# Patient Record
Sex: FEMALE | Race: WHITE | Employment: OTHER | URBAN - METROPOLITAN AREA
[De-identification: names, ages, dates, MRNs, and addresses within clinical notes are randomized per-mention and may not be internally consistent; named-entity substitution may affect disease eponyms.]

---

## 2023-10-27 PROBLEM — F32.A DEPRESSION: Status: ACTIVE | Noted: 2023-10-27

## 2023-10-27 PROBLEM — I34.1 MITRAL VALVE PROLAPSE: Status: ACTIVE | Noted: 2023-10-27

## 2023-10-27 PROBLEM — L40.3 PUSTULAR PSORIASIS OF PALMS AND SOLES: Status: ACTIVE | Noted: 2023-10-27

## 2023-10-27 PROBLEM — K58.0 IRRITABLE BOWEL SYNDROME WITH DIARRHEA: Status: ACTIVE | Noted: 2023-10-27

## 2023-10-27 PROBLEM — L40.0 PSORIASIS VULGARIS: Status: ACTIVE | Noted: 2023-08-10

## 2023-10-27 RX ORDER — SERTRALINE HYDROCHLORIDE 100 MG/1
2 TABLET, FILM COATED ORAL DAILY
COMMUNITY

## 2023-10-27 RX ORDER — IXEKIZUMAB 80 MG/ML
INJECTION, SOLUTION SUBCUTANEOUS
COMMUNITY
Start: 2022-02-14

## 2023-10-27 RX ORDER — LEFLUNOMIDE 20 MG/1
TABLET ORAL
COMMUNITY
Start: 2017-08-29 | End: 2023-10-31 | Stop reason: ALTCHOICE

## 2023-10-27 RX ORDER — FOLIC ACID 1 MG/1
1 TABLET ORAL
COMMUNITY

## 2023-10-27 RX ORDER — CLOBETASOL PROPIONATE 0.5 MG/G
OINTMENT TOPICAL
COMMUNITY
Start: 2016-03-11

## 2023-10-27 RX ORDER — NABUMETONE 750 MG/1
TABLET, FILM COATED ORAL
COMMUNITY
Start: 2017-08-29

## 2023-10-27 RX ORDER — AMITRIPTYLINE HYDROCHLORIDE 25 MG/1
1 TABLET, FILM COATED ORAL NIGHTLY
COMMUNITY

## 2023-10-27 RX ORDER — ARIPIPRAZOLE 10 MG/1
1 TABLET ORAL DAILY
COMMUNITY

## 2023-10-27 RX ORDER — SIMVASTATIN 40 MG/1
1 TABLET, FILM COATED ORAL DAILY
COMMUNITY

## 2023-10-27 RX ORDER — METHOTREXATE 25 MG/ML
0.5 INJECTION, SOLUTION INTRA-ARTERIAL; INTRAMUSCULAR; INTRAVENOUS
COMMUNITY
Start: 2015-01-07

## 2023-10-27 RX ORDER — CLOBETASOL PROPIONATE 0.5 MG/G
CREAM TOPICAL
COMMUNITY
Start: 2015-11-24

## 2023-10-27 RX ORDER — BUPROPION HYDROCHLORIDE 200 MG/1
1 TABLET, EXTENDED RELEASE ORAL DAILY
COMMUNITY

## 2023-10-31 ENCOUNTER — OFFICE VISIT (OUTPATIENT)
Dept: DERMATOLOGY | Facility: CLINIC | Age: 58
End: 2023-10-31
Payer: MEDICARE

## 2023-10-31 DIAGNOSIS — L40.9 PSORIASIS: Primary | ICD-10-CM

## 2023-10-31 PROCEDURE — 99214 OFFICE O/P EST MOD 30 MIN: CPT | Performed by: NURSE PRACTITIONER

## 2023-10-31 NOTE — PROGRESS NOTES
Subjective     Jazmyne Neal is a 58 y.o. female who presents for the following: Skin Check (Presents to office today for psoriasis follow-up. Overall doing well, Taltz injection was 2 weeks ago. Denies flares. No further complaints.).     Review of Systems:  No other skin or systemic complaints other than what is documented elsewhere in the note.    The following portions of the chart were reviewed this encounter and updated as appropriate:       Skin Cancer History  No skin cancer on file.    Specialty Problems          Dermatology Problems    Psoriasis vulgaris    Pustular psoriasis of palms and soles     Past Medical History:  Jazmyne Neal  has no past medical history on file.    Past Surgical History:  Jazmyne Neal  has a past surgical history that includes Other surgical history (07/12/2016).    Family History:  Patient family history includes Heart attack in her father.    Social History:  Jazmyne Neal  has no history on file for tobacco use, alcohol use, and drug use.    Allergies:  Metformin    Current Medications / CAM's:    Current Outpatient Medications:     amitriptyline (Elavil) 25 mg tablet, Take 1 tablet (25 mg) by mouth once daily at bedtime., Disp: , Rfl:     ARIPiprazole (Abilify) 10 mg tablet, Take 1 tablet (10 mg) by mouth once daily., Disp: , Rfl:     buPROPion SR (Wellbutrin SR) 200 mg 12 hr tablet, Take 1 tablet (200 mg) by mouth once daily., Disp: , Rfl:     clobetasol (Temovate) 0.05 % cream, 1 Application, Disp: , Rfl:     clobetasol (Temovate) 0.05 % ointment, 1 Application, Disp: , Rfl:     folic acid (Folvite) 1 mg tablet, Take 1 tablet (1 mg) by mouth once daily. except not on the day of methotrexate, Disp: , Rfl:     INFLIXIMAB IV, Infuse into a venous catheter. every 6 weeks, Disp: , Rfl:     ixekizumab (Taltz Autoinjector) 80 mg/mL injection, 0, Disp: , Rfl:     methotrexate 25 mg/mL injection, 0.5 mL (12.5 mg)., Disp: , Rfl:     nabumetone (Relafen) 750 mg tablet, 1 Tablet, Disp: , Rfl:      sertraline (Zoloft) 100 mg tablet, Take 2 tablets (200 mg) by mouth once daily., Disp: , Rfl:     simvastatin (Zocor) 40 mg tablet, Take 1 tablet (40 mg) by mouth once daily., Disp: , Rfl:     ixekizumab (Taltz) 80 mg/mL injection, Inject 1 mL (80 mg) under the skin every 28 (twenty-eight) days., Disp: 1 mL, Rfl: 11     Objective   Well appearing patient in no apparent distress; mood and affect are within normal limits.    A focused skin examination was performed. All findings within normal limits unless otherwise noted below.    Assessment/Plan   1. Psoriasis  Well-demarcated erythematous papules and plaques with overlying silvery scale.    PSORIASIS        Psoriasis is a lifelong skin disorder that can cause areas of thickened, scaly and red skin that can be very itchy. Psoriasis can affect your joints and has been linked to other conditions like diabetes, heart disease, depression, high cholesterol and others.        Psoriasis is usually diagnosed by a skin exam but your provider may do a biopsy (sample of tissue). It is not an infection or spread from person to person. You cannot predict when psoriasis may improve or flare. It is important to share with your provider how this condition affects your body and quality of life.     TREATMENT  Psoriasis is not curable, but treatments may reduce the symptoms and appearance of the disease.  Your provider may recommend one or more therapies. It is very important that you carefully follow the providers instructions for use.     CAUSES  - There is no known cause of psoriasis.  - It may be caused by a combination of immune, genetic, and environmental factors.   - Medications, physical or emotional stress and infections may cause flares.   - Smoking can worsen psoriasis, especially the palms and soles.     RESOURCES  The National Psoriasis Foundation (www.psoriasis.org) and the American Academy of Dermatology (https://www.aad.org/public/diseases/scaly-skin/psoriasis) are  available to provide psoriasis information and support.    Plan   - Continue Taltz as directed. PASI 100  - Reviewed outside provider notes for health specialty or primary care changes and updated chart and plan based on findings, if any.   - Labs ordered; Tspot  - Counseled patient regarding the chronic nature of this condition. Discussed current and/or newly prescribed medications as well as reasons to call office prior to next visit (new plaques or flares, new joint pain, etc).   - Addressed concerns and questions regarding the treatment and plan with patient in office today.   - Risks, benefits, side effects, alternatives and options were discussed with patient and the patient voiced understanding.        T-SPOT (Tb)    ixekizumab (Taltz) 80 mg/mL injection  Inject 1 mL (80 mg) under the skin every 28 (twenty-eight) days.

## 2023-11-07 ENCOUNTER — LAB (OUTPATIENT)
Dept: LAB | Facility: LAB | Age: 58
End: 2023-11-07
Payer: MEDICARE

## 2023-11-07 DIAGNOSIS — H15.102 UNSPECIFIED EPISCLERITIS, LEFT EYE: ICD-10-CM

## 2023-11-07 DIAGNOSIS — L40.9 PSORIASIS: ICD-10-CM

## 2023-11-07 DIAGNOSIS — H15.102 UNSPECIFIED EPISCLERITIS, LEFT EYE: Primary | ICD-10-CM

## 2023-11-07 PROCEDURE — 86780 TREPONEMA PALLIDUM: CPT

## 2023-11-07 PROCEDURE — 86038 ANTINUCLEAR ANTIBODIES: CPT

## 2023-11-07 PROCEDURE — 86225 DNA ANTIBODY NATIVE: CPT

## 2023-11-07 PROCEDURE — 86481 TB AG RESPONSE T-CELL SUSP: CPT

## 2023-11-07 PROCEDURE — 85025 COMPLETE CBC W/AUTO DIFF WBC: CPT

## 2023-11-07 PROCEDURE — 86235 NUCLEAR ANTIGEN ANTIBODY: CPT

## 2023-11-07 PROCEDURE — 86140 C-REACTIVE PROTEIN: CPT

## 2023-11-07 PROCEDURE — 36415 COLL VENOUS BLD VENIPUNCTURE: CPT

## 2023-11-07 PROCEDURE — 85652 RBC SED RATE AUTOMATED: CPT

## 2023-11-08 LAB
ANA PATTERN: ABNORMAL
ANA SER QL HEP2 SUBST: POSITIVE
ANA TITR SER IF: ABNORMAL {TITER}
BASOPHILS # BLD AUTO: 0.04 X10*3/UL (ref 0–0.1)
BASOPHILS NFR BLD AUTO: 0.7 %
CENTROMERE B AB SER-ACNC: <0.2 AI
CHROMATIN AB SERPL-ACNC: <0.2 AI
CRP SERPL-MCNC: 0.29 MG/DL
DSDNA AB SER-ACNC: <1 IU/ML
ENA JO1 AB SER QL IA: <0.2 AI
ENA RNP AB SER IA-ACNC: 0.2 AI
ENA SCL70 AB SER QL IA: <0.2 AI
ENA SM AB SER IA-ACNC: <0.2 AI
ENA SM+RNP AB SER QL IA: <0.2 AI
ENA SS-A AB SER IA-ACNC: 0.2 AI
ENA SS-B AB SER IA-ACNC: <0.2 AI
EOSINOPHIL # BLD AUTO: 0.14 X10*3/UL (ref 0–0.7)
EOSINOPHIL NFR BLD AUTO: 2.5 %
ERYTHROCYTE [DISTWIDTH] IN BLOOD BY AUTOMATED COUNT: 12.5 % (ref 11.5–14.5)
ERYTHROCYTE [SEDIMENTATION RATE] IN BLOOD BY WESTERGREN METHOD: 8 MM/H (ref 0–30)
HCT VFR BLD AUTO: 41.7 % (ref 36–46)
HGB BLD-MCNC: 13.3 G/DL (ref 12–16)
IMM GRANULOCYTES # BLD AUTO: 0.02 X10*3/UL (ref 0–0.7)
IMM GRANULOCYTES NFR BLD AUTO: 0.4 % (ref 0–0.9)
LYMPHOCYTES # BLD AUTO: 1.51 X10*3/UL (ref 1.2–4.8)
LYMPHOCYTES NFR BLD AUTO: 26.9 %
MCH RBC QN AUTO: 29.4 PG (ref 26–34)
MCHC RBC AUTO-ENTMCNC: 31.9 G/DL (ref 32–36)
MCV RBC AUTO: 92 FL (ref 80–100)
MONOCYTES # BLD AUTO: 0.35 X10*3/UL (ref 0.1–1)
MONOCYTES NFR BLD AUTO: 6.2 %
NEUTROPHILS # BLD AUTO: 3.55 X10*3/UL (ref 1.2–7.7)
NEUTROPHILS NFR BLD AUTO: 63.3 %
NRBC BLD-RTO: 0 /100 WBCS (ref 0–0)
PLATELET # BLD AUTO: 165 X10*3/UL (ref 150–450)
RBC # BLD AUTO: 4.52 X10*6/UL (ref 4–5.2)
RIBOSOMAL P AB SER-ACNC: <0.2 AI
T PALLIDUM AB SER QL: NONREACTIVE
WBC # BLD AUTO: 5.6 X10*3/UL (ref 4.4–11.3)

## 2023-11-09 LAB
NIL(NEG) CONTROL SPOT COUNT: NORMAL
PANEL A SPOT COUNT: 0
PANEL B SPOT COUNT: 0
POS CONTROL SPOT COUNT: NORMAL
T-SPOT. TB INTERPRETATION: NEGATIVE

## 2024-11-04 ENCOUNTER — APPOINTMENT (OUTPATIENT)
Dept: DERMATOLOGY | Facility: CLINIC | Age: 59
End: 2024-11-04
Payer: MEDICARE

## 2024-11-04 DIAGNOSIS — L40.9 PSORIASIS: Primary | ICD-10-CM

## 2024-11-04 PROCEDURE — 99214 OFFICE O/P EST MOD 30 MIN: CPT | Performed by: NURSE PRACTITIONER

## 2024-11-04 RX ORDER — GLIMEPIRIDE 2 MG/1
TABLET ORAL
COMMUNITY
Start: 2024-11-01

## 2024-11-04 RX ORDER — CLOBETASOL PROPIONATE 0.5 MG/G
CREAM TOPICAL
Qty: 180 G | Refills: 3 | Status: SHIPPED | OUTPATIENT
Start: 2024-11-04

## 2024-11-04 RX ORDER — LAMOTRIGINE 200 MG/1
TABLET ORAL
COMMUNITY
Start: 2024-11-01

## 2024-11-04 RX ORDER — LAMOTRIGINE 100 MG/1
TABLET ORAL
COMMUNITY
Start: 2024-11-01

## 2024-11-04 RX ORDER — LEVOTHYROXINE SODIUM 75 UG/1
TABLET ORAL
COMMUNITY
Start: 2024-11-01

## 2024-11-04 NOTE — PROGRESS NOTES
Subjective     Jazmyne Neal is a 59 y.o. female who presents for the following: Psoriasis (Annual psoriasis follow up. Pt remains on the taltz with good response. Mild flaring to palms. ).     Review of Systems:  No other skin or systemic complaints other than what is documented elsewhere in the note.    The following portions of the chart were reviewed this encounter and updated as appropriate:  Tobacco  Allergies  Meds  Problems  Med Hx  Surg Hx  Fam Hx       Skin Cancer History  No skin cancer on file.    Specialty Problems          Dermatology Problems    Psoriasis vulgaris    Pustular psoriasis of palms and soles     Past Medical History:  Jazmyne Neal  has no past medical history on file.    Past Surgical History:  Jazmyne Neal  has a past surgical history that includes Other surgical history (07/12/2016).    Family History:  Patient family history includes Heart attack in her father.    Social History:  Jazmyne Neal  reports that she has quit smoking. Her smoking use included cigarettes. She does not have any smokeless tobacco history on file. No history on file for alcohol use and drug use.    Allergies:  Metformin    Current Medications / CAM's:    Current Outpatient Medications:     amitriptyline (Elavil) 25 mg tablet, Take 1 tablet (25 mg) by mouth once daily at bedtime., Disp: , Rfl:     ARIPiprazole (Abilify) 10 mg tablet, Take 1 tablet (10 mg) by mouth once daily., Disp: , Rfl:     buPROPion SR (Wellbutrin SR) 200 mg 12 hr tablet, Take 1 tablet (200 mg) by mouth once daily., Disp: , Rfl:     clobetasol (Temovate) 0.05 % cream, 1 Application, Disp: , Rfl:     clobetasol (Temovate) 0.05 % ointment, 1 Application, Disp: , Rfl:     glimepiride (Amaryl) 2 mg tablet, , Disp: , Rfl:     INFLIXIMAB IV, Infuse into a venous catheter. every 6 weeks, Disp: , Rfl:     ixekizumab (Taltz Autoinjector) 80 mg/mL injection, 0, Disp: , Rfl:     lamoTRIgine (LaMICtal) 100 mg tablet, , Disp: , Rfl:     lamoTRIgine (LaMICtal)  200 mg tablet, , Disp: , Rfl:     levothyroxine (Synthroid, Levoxyl) 75 mcg tablet, , Disp: , Rfl:     nabumetone (Relafen) 750 mg tablet, 1 Tablet (Patient not taking: Reported on 11/4/2024), Disp: , Rfl:     sertraline (Zoloft) 100 mg tablet, Take 2 tablets (200 mg) by mouth once daily., Disp: , Rfl:     simvastatin (Zocor) 40 mg tablet, Take 1 tablet (40 mg) by mouth once daily., Disp: , Rfl:      Objective   Well appearing patient in no apparent distress; mood and affect are within normal limits.    A focused skin examination was performed. All findings within normal limits unless otherwise noted below.    Assessment/Plan   1. Psoriasis  Well-demarcated erythematous papules and plaques with overlying silvery scale. PASI 95 on Taltz    PSORIASIS        Psoriasis is a lifelong skin disorder that can cause areas of thickened, scaly and red skin that can be very itchy. Psoriasis can affect your joints and has been linked to other conditions like diabetes, heart disease, depression, high cholesterol and others.        Psoriasis is usually diagnosed by a skin exam but your provider may do a biopsy (sample of tissue). It is not an infection or spread from person to person. You cannot predict when psoriasis may improve or flare. It is important to share with your provider how this condition affects your body and quality of life.     TREATMENT  Psoriasis is not curable, but treatments may reduce the symptoms and appearance of the disease.  Your provider may recommend one or more therapies. It is very important that you carefully follow the providers instructions for use.     CAUSES  - There is no known cause of psoriasis.  - It may be caused by a combination of immune, genetic, and environmental factors.   - Medications, physical or emotional stress and infections may cause flares.   - Smoking can worsen psoriasis, especially the palms and soles.     RESOURCES  The National Psoriasis Foundation (www.psoriasis.org) and  the American Academy of Dermatology (https://www.aad.org/public/diseases/scaly-skin/psoriasis) are available to provide psoriasis information and support.    Plan   - Continue Taltz as directed. PASI 95   - Reviewed outside provider notes for health specialty or primary care changes and updated chart and plan based on findings, if any.   - Labs ordered; Tspot  - Counseled patient regarding the chronic nature of this condition. Discussed current and/or newly prescribed medications as well as reasons to call office prior to next visit (new plaques or flares, new joint pain, etc).   - Addressed concerns and questions regarding the treatment and plan with patient in office today.   - Risks, benefits, side effects, alternatives and options were discussed with patient and the patient voiced understanding.        Follow up in 9 months for psoriasis. Please call me if there are any changes or development of concerning symptoms (lesion/skin condition is changing, bleeding, enlarging, or worsening).

## 2024-12-10 DIAGNOSIS — L40.0 PSORIASIS VULGARIS: Primary | ICD-10-CM

## 2024-12-10 RX ORDER — IXEKIZUMAB 80 MG/ML
INJECTION, SOLUTION SUBCUTANEOUS
Qty: 3 ML | Refills: 0 | Status: SHIPPED | OUTPATIENT
Start: 2024-12-10

## 2025-03-24 ENCOUNTER — TELEPHONE (OUTPATIENT)
Dept: DERMATOLOGY | Facility: CLINIC | Age: 60
End: 2025-03-24
Payer: MEDICARE

## 2025-03-24 NOTE — TELEPHONE ENCOUNTER
Pt contacted office and states that she needs her [place] paperwork filled out for this year. Pt states she will bring the papers to the office on 03/25/2025.    Kam Worrell LPN

## 2025-09-05 ENCOUNTER — APPOINTMENT (OUTPATIENT)
Dept: DERMATOLOGY | Facility: CLINIC | Age: 60
End: 2025-09-05
Payer: MEDICARE

## 2025-09-05 ASSESSMENT — DERMATOLOGY QUALITY OF LIFE (QOL) ASSESSMENT
RATE HOW BOTHERED YOU ARE BY EFFECTS OF YOUR SKIN PROBLEMS ON YOUR ACTIVITIES (EG, GOING OUT, ACCOMPLISHING WHAT YOU WANT, WORK ACTIVITIES OR YOUR RELATIONSHIPS WITH OTHERS): 0 - NEVER BOTHERED
RATE HOW BOTHERED YOU ARE BY SYMPTOMS OF YOUR SKIN PROBLEM (EG, ITCHING, STINGING BURNING, HURTING OR SKIN IRRITATION): 1
DATE THE QUALITY-OF-LIFE ASSESSMENT WAS COMPLETED: 67453
ARE THERE EXCLUSIONS OR EXCEPTIONS FOR THE QUALITY OF LIFE ASSESSMENT: NO
RATE HOW EMOTIONALLY BOTHERED YOU ARE BY YOUR SKIN PROBLEM (FOR EXAMPLE, WORRY, EMBARRASSMENT, FRUSTRATION): 1
WHAT SINGLE SKIN CONDITION LISTED BELOW IS THE PATIENT ANSWERING THE QUALITY-OF-LIFE ASSESSMENT QUESTIONS ABOUT: PSORIASIS

## 2025-09-05 ASSESSMENT — DERMATOLOGY PATIENT ASSESSMENT
HAVE YOU HAD OR DO YOU HAVE A STAPH INFECTION: NO
ARE YOU AN ORGAN TRANSPLANT RECIPIENT: NO
ARE YOU ON BIRTH CONTROL: NO
DO YOU USE A TANNING BED: NO
HAVE YOU HAD OR DO YOU HAVE VASCULAR DISEASE: NO
DO YOU HAVE ANY NEW OR CHANGING LESIONS: NO
DO YOU HAVE IRREGULAR MENSTRUAL CYCLES: NO
ARE YOU TRYING TO GET PREGNANT: NO

## 2025-09-05 ASSESSMENT — ITCH NUMERIC RATING SCALE: HOW SEVERE IS YOUR ITCHING?: 0

## 2025-09-05 ASSESSMENT — PATIENT GLOBAL ASSESSMENT (PGA): PATIENT GLOBAL ASSESSMENT: PATIENT GLOBAL ASSESSMENT:  2 - MILD

## 2026-09-16 ENCOUNTER — APPOINTMENT (OUTPATIENT)
Dept: DERMATOLOGY | Facility: CLINIC | Age: 61
End: 2026-09-16
Payer: MEDICARE